# Patient Record
Sex: FEMALE | Race: WHITE | Employment: UNEMPLOYED | URBAN - METROPOLITAN AREA
[De-identification: names, ages, dates, MRNs, and addresses within clinical notes are randomized per-mention and may not be internally consistent; named-entity substitution may affect disease eponyms.]

---

## 2017-09-25 ENCOUNTER — OFFICE VISIT (OUTPATIENT)
Dept: INTERNAL MEDICINE CLINIC | Age: 57
End: 2017-09-25

## 2017-09-25 VITALS
HEIGHT: 65 IN | TEMPERATURE: 98 F | BODY MASS INDEX: 22.76 KG/M2 | DIASTOLIC BLOOD PRESSURE: 54 MMHG | RESPIRATION RATE: 14 BRPM | OXYGEN SATURATION: 98 % | WEIGHT: 136.6 LBS | HEART RATE: 85 BPM | SYSTOLIC BLOOD PRESSURE: 116 MMHG

## 2017-09-25 DIAGNOSIS — N93.9 VAGINAL BLEEDING: ICD-10-CM

## 2017-09-25 DIAGNOSIS — R30.0 DYSURIA: ICD-10-CM

## 2017-09-25 DIAGNOSIS — Z91.09 ENVIRONMENTAL ALLERGIES: Primary | ICD-10-CM

## 2017-09-25 DIAGNOSIS — Z00.00 ROUTINE GENERAL MEDICAL EXAMINATION AT A HEALTH CARE FACILITY: ICD-10-CM

## 2017-09-25 NOTE — PROGRESS NOTES
HISTORY OF PRESENT ILLNESS  Paolo Caldwell is a 61 y.o. female. HPI   Environmental allergies: Patient reports a bad cough. She states she has allergies. Vaginal bleeding: Patient reports dryness and pain with intercourse. She states she noticed bleeding after intercourse on two separate occassions. She states on both occasions the blood lasted for about a day in urine and in underwear. She states she has had burning with urination after intercourse. Dysuria: In addition patient has had an increased frequency with urination and pain with urination. Review of Systems   All other systems reviewed and are negative. Physical Exam   Constitutional: She is oriented to person, place, and time. She appears well-developed and well-nourished. HENT:   Head: Normocephalic and atraumatic. Right Ear: External ear normal.   Left Ear: External ear normal.   Nose: Nose normal.   Mouth/Throat: Oropharynx is clear and moist.   Eyes: Conjunctivae and EOM are normal.   Neck: Normal range of motion. Neck supple. Carotid bruit is not present. No thyroid mass and no thyromegaly present. Cardiovascular: Normal rate, regular rhythm, S1 normal, S2 normal, normal heart sounds and intact distal pulses. Pulmonary/Chest: Effort normal and breath sounds normal.   Abdominal: Soft. Normal appearance and bowel sounds are normal. There is no hepatosplenomegaly. There is no tenderness. Musculoskeletal: Normal range of motion. Neurological: She is alert and oriented to person, place, and time. She has normal strength. No cranial nerve deficit or sensory deficit. Coordination normal.   Skin: Skin is warm, dry and intact. No abrasion and no rash noted. Psychiatric: She has a normal mood and affect. Her behavior is normal. Judgment and thought content normal.   Nursing note and vitals reviewed. ASSESSMENT and PLAN  Diagnoses and all orders for this visit:    1. Environmental allergies  Patient will continue to watch.  She will follow up if condition worsens. 2. Vaginal bleeding  Patient will follow up with Dr. Denise Pearce. We are going to get an appt with her as she has had postmenopausal bleeding associated with intercourse    3. Routine general medical examination at a health care facility  -     CBC W/O DIFF  -     METABOLIC PANEL, COMPREHENSIVE  -     LIPID PANEL  -     TSH 3RD GENERATION    4. Dysuria  Will test urine and send for culture. -     AMB POC URINALYSIS DIP STICK AUTO W/O MICRO      lab results and schedule of future lab studies reviewed with patient  reviewed diet, exercise and weight control    Written by Jonathan Carey, as dictated by Mary Jim MD.     Current diagnosis and concerns discussed with pt at length. Understands risks and benefits or current treatment plan and medications and accepts the treatment and medication with any possible risks. Pt asks appropriate questions which were answered. Pt instructed to call with any concerns or problems.

## 2017-09-28 ENCOUNTER — DOCUMENTATION ONLY (OUTPATIENT)
Dept: INTERNAL MEDICINE CLINIC | Age: 57
End: 2017-09-28

## 2017-09-28 LAB
ALBUMIN SERPL-MCNC: 4.3 G/DL (ref 3.5–5.5)
ALBUMIN/GLOB SERPL: 1.7 {RATIO} (ref 1.2–2.2)
ALP SERPL-CCNC: 60 IU/L (ref 39–117)
ALT SERPL-CCNC: 26 IU/L (ref 0–32)
AST SERPL-CCNC: 21 IU/L (ref 0–40)
BILIRUB SERPL-MCNC: 0.3 MG/DL (ref 0–1.2)
BUN SERPL-MCNC: 14 MG/DL (ref 6–24)
BUN/CREAT SERPL: 16 (ref 9–23)
CALCIUM SERPL-MCNC: 9.3 MG/DL (ref 8.7–10.2)
CHLORIDE SERPL-SCNC: 103 MMOL/L (ref 96–106)
CHOLEST SERPL-MCNC: 190 MG/DL (ref 100–199)
CO2 SERPL-SCNC: 26 MMOL/L (ref 18–29)
CREAT SERPL-MCNC: 0.86 MG/DL (ref 0.57–1)
ERYTHROCYTE [DISTWIDTH] IN BLOOD BY AUTOMATED COUNT: 13.9 % (ref 12.3–15.4)
GLOBULIN SER CALC-MCNC: 2.5 G/DL (ref 1.5–4.5)
GLUCOSE SERPL-MCNC: 95 MG/DL (ref 65–99)
HCT VFR BLD AUTO: 41 % (ref 34–46.6)
HDLC SERPL-MCNC: 92 MG/DL
HGB BLD-MCNC: 13.4 G/DL (ref 11.1–15.9)
INTERPRETATION, 910389: NORMAL
LDLC SERPL CALC-MCNC: 87 MG/DL (ref 0–99)
MCH RBC QN AUTO: 30 PG (ref 26.6–33)
MCHC RBC AUTO-ENTMCNC: 32.7 G/DL (ref 31.5–35.7)
MCV RBC AUTO: 92 FL (ref 79–97)
PLATELET # BLD AUTO: 339 X10E3/UL (ref 150–379)
POTASSIUM SERPL-SCNC: 4.3 MMOL/L (ref 3.5–5.2)
PROT SERPL-MCNC: 6.8 G/DL (ref 6–8.5)
RBC # BLD AUTO: 4.46 X10E6/UL (ref 3.77–5.28)
SODIUM SERPL-SCNC: 143 MMOL/L (ref 134–144)
TRIGL SERPL-MCNC: 56 MG/DL (ref 0–149)
TSH SERPL DL<=0.005 MIU/L-ACNC: 3.66 UIU/ML (ref 0.45–4.5)
VLDLC SERPL CALC-MCNC: 11 MG/DL (ref 5–40)
WBC # BLD AUTO: 5.2 X10E3/UL (ref 3.4–10.8)

## 2017-09-28 NOTE — PROGRESS NOTES
Obtained appt with Dr. Alice Wilson, GYN, for Monday Oct 2 at 1:00. Pt is unable to make that appointment and will call to reschedule.

## 2017-10-26 ENCOUNTER — OFFICE VISIT (OUTPATIENT)
Dept: OBGYN CLINIC | Age: 57
End: 2017-10-26

## 2017-10-26 VITALS
DIASTOLIC BLOOD PRESSURE: 80 MMHG | HEIGHT: 65 IN | WEIGHT: 135 LBS | SYSTOLIC BLOOD PRESSURE: 120 MMHG | BODY MASS INDEX: 22.49 KG/M2

## 2017-10-26 DIAGNOSIS — N93.0 POSTCOITAL BLEEDING: ICD-10-CM

## 2017-10-26 DIAGNOSIS — N95.0 POST-MENOPAUSAL BLEEDING: Primary | ICD-10-CM

## 2017-10-26 DIAGNOSIS — Z71.1 WORRIED WELL: ICD-10-CM

## 2017-10-26 DIAGNOSIS — N89.8 VAGINAL DISCHARGE: ICD-10-CM

## 2017-10-26 LAB — WET MOUNT POCT, WMPOCT: NORMAL

## 2017-10-26 NOTE — PROGRESS NOTES
[address] [facility]  [email or web address]  [phone]    August Osgood, MD, FACOG       OB/GYN Problem visit    Chief Complaint:   Chief Complaint   Patient presents with    Post Menopausal Bleeding       History of Present Illness: This is a new problem being evaluated by this provider. The patient is a 61 y.o. No obstetric history on file. female who reports having IC for the [facility] time in [facility] long time\" after getting , and she red spotting when she wiped for about 1 day after IC. Patient states that it has happened twice now. Patient states that her last period was around age 47. She reports the symptoms are is unchanged. Aggravating factors include IC. Alleviating factors include none. Also concerned re unprotected intercourse    She does not have other concerns. LMP: No LMP recorded. Patient is postmenopausal.    PFSH:  Past Medical History:   Diagnosis Date    Hx of colonoscopy     Hx of mammogram 2015    Negative     Routine Papanicolaou smear      Past Surgical History:   Procedure Laterality Date    HX  SECTION      HX ORTHOPAEDIC      ACL recon left     Family History   Problem Relation Age of Onset    Diabetes Mother     Hypertension Mother     Heart Disease Father 79    Dementia Father      Social History   Substance Use Topics    Smoking status: Never Smoker    Smokeless tobacco: Never Used    Alcohol use No     Allergies   Allergen Reactions    Pcn [Penicillins] Nausea Only     No current outpatient prescriptions on file. No current facility-administered medications for this visit.         Review of Systems:  History obtained from the patient  Constitutional: negative for fevers, chills and weight loss  ENT ROS: negative for - hearing change, oral lesions or visual changes  Respiratory: negative for cough, wheezing or dyspnea on exertion  Cardiovascular: negative for chest pain, irregular heart beats, exertional chest pressure/discomfort  Gastrointestinal: negative for dysphagia, nausea and vomiting  Genito-Urinary ROS:  see HPI  Inteument/breast: negative for rash, breast lump and nipple discharge  Musculoskeletal:negative for stiff joints, neck pain and muscle weakness  Endocrine ROS: negative for - breast changes, galactorrhea or temperature intolerance  Hematological and Lymphatic ROS: negative for - blood clots, bruising or swollen lymph nodes    Physical Exam:  Visit Vitals    /80    Ht 5' 5\" (1.651 m)    Wt 135 lb (61.2 kg)    BMI 22.47 kg/m2       GENERAL: alert, well appearing, and in no distress  HEAD: normocephalic, atraumatic. PULM: clear to auscultation, no wheezes, rales or rhonchi, symmetric air entry   COR: normal rate and regular rhythm, S1 and S2 normal   ABDOMEN: soft, nontender, nondistended, no masses or organomegaly   EGBUS: no lesions, no inflammation, no masses  VULVA: normal appearing vulva with no masses, tenderness or lesions  VAGINA: normal appearing vagina with normal color, no lesions, yellow and thin discharge  CERVIX: normal appearing cervix without discharge or lesions, non tender  UTERUS: uterus is normal size, shape, consistency and nontender   ADNEXA: normal adnexa in size, nontender and no masses  NEURO: alert, oriented, normal speech    Assessment:  Encounter Diagnoses   Name Primary?  Post-menopausal bleeding Yes    Vaginal discharge     Postcoital bleeding     Worried well        Plan:  The patient is advised that she should contact the office if she does not note improvement or if symptoms recur  Recommend follow up with PCP for non-gynecologic complaints and chronic medical problems. She should contact our office with any questions or concerns  She could keep her routine annual exam appointment.    Discussed avoiding painful intercourse, generous lubrication and vaginal moisturiser for at lest three months  FU three months if NI  Discussed safer sex practices, condom use and risk factors for sexually transmitted diseases.        Disc causes of AUB and importance of evaluation  Rec fu and US, add AE if due, poss endo bx  Endo bx h/o    rec std blood work< pt left before doing labs, can offer at [facility]    Orders Placed This Encounter    NUSWAB VAGINITIS PLUS    AMB POC WET PREP (AKA STAIN, INTERPRET, WET MOUNT)       Results for orders placed or performed in visit on 10/26/17   AMB POC SMEAR, STAIN & INTERPRET, WET MOUNT   Result Value Ref Range    Wet mount (POC)      Narrative    PRASANTH    Hypae: negative  Buds: negative    Wet Prep:  Trich: negative  Clue cells: negative  Hyphae: negative  Buds: negative  WBC's: normal

## 2017-10-26 NOTE — MR AVS SNAPSHOT
Visit Information Date & Time Provider Department Dept. Phone Encounter #  
 10/26/2017  9:20 AM Angel Monsalve MD [facility] [phone] [id number] Upcoming Health Maintenance Date Due Hepatitis C Screening 1960 PAP AKA CERVICAL CYTOLOGY 4/20/2013 BREAST CANCER SCRN MAMMOGRAM 1/7/2017 INFLUENZA AGE 9 TO ADULT 8/1/2017 COLONOSCOPY 2/3/2020 Allergies as of 10/26/2017  Review Complete On: 10/26/2017 By: Corrie Chisholm LPN Severity Noted Reaction Type Reactions Pcn [Penicillins]  04/20/2011   Side Effect Nausea Only Current Immunizations  Never Reviewed No immunizations on file. Not reviewed this visit Vitals BP Height(growth percentile) Weight(growth percentile) BMI OB Status Smoking Status 120/80 5' 5\" (1.651 m) 135 lb (61.2 kg) 22.47 kg/m2 Postmenopausal Never Smoker BMI and BSA Data Body Mass Index Body Surface Area  
 22.47 kg/m 2 1.68 m 2 Preferred Pharmacy Pharmacy Name Phone [address], [address] [place] [address] [phone] Your Updated Medication List  
  
Notice  As of 10/26/2017  9:59 AM  
 You have not been prescribed any medications. Patient Instructions Vaginal Bleeding After Sex: Care Instructions Your Care Instructions Bleeding from the vagina after sex often is caused by an infection. Other possible causes are a tear in the vagina or a growth (polyp) on the cervix. You may need a physical and pelvic exam to find the cause of your vaginal bleeding. Follow-up care is a key part of your treatment and safety. Be sure to make and go to all appointments, and call your doctor if you are having problems. It's also a good idea to know your test results and keep a list of the medicines you take. How can you care for yourself at home? · If your doctor gave you medicine, take it exactly as prescribed.  Call your doctor if you think you are having a problem with your medicine. · Do not douche. · Use pads, not tampons, for menstrual bleeding. · Do not have sex until you talk with your doctor. · Be sure to tell your sex partner or partners that you have had bleeding after sex. They may need a doctor to check them for infection. When should you call for help? Call your doctor now or seek immediate medical care if: 
· You have severe vaginal bleeding. · You have new or worse belly or pelvic pain. Watch closely for changes in your health, and be sure to contact your doctor if: 
· You have unusual vaginal bleeding. · You do not get better as expected. Where can you learn more? Go to http://donna-jesu.info/. Enter C122 in the search box to learn more about \"Vaginal Bleeding After Sex: Care Instructions. \" Current as of: October 13, 2016 Content Version: 11.4 © 6978-3305 [employer]. Care instructions adapted under license by [employer] (which disclaims liability or warranty for this information). If you have questions about a medical condition or this instruction, always ask your healthcare professional. [place] [address] any warranty or liability for your use of this information. Introducing \[facility] [place] [facility]\"" & HEALTH SERVICES! Dear Lenin Thompson: Thank you for requesting a Ak?Lex account. Our records indicate that you have previously registered for a Ak?Lex account but its currently inactive. Please call our Ak?Lex support line at [phone]. Additional Information If you have questions, please visit the Frequently Asked Questions section of the Ak?Lex website at [email or web address] Remember, Ak?Lex is NOT to be used for urgent needs. For medical emergencies, dial 911. Now available from your iPhone and Android! Please provide this summary of care documentation to your next provider. Your primary care clinician is listed as Meeta Greene. If you have any questions after today's visit, please call [phone].

## 2017-10-26 NOTE — PATIENT INSTRUCTIONS
Vaginal Bleeding After Sex: Care Instructions  Your Care Instructions    Bleeding from the vagina after sex often is caused by an infection. Other possible causes are a tear in the vagina or a growth (polyp) on the cervix. You may need a physical and pelvic exam to find the cause of your vaginal bleeding. Follow-up care is a key part of your treatment and safety. Be sure to make and go to all appointments, and call your doctor if you are having problems. It's also a good idea to know your test results and keep a list of the medicines you take. How can you care for yourself at home? · If your doctor gave you medicine, take it exactly as prescribed. Call your doctor if you think you are having a problem with your medicine. · Do not douche. · Use pads, not tampons, for menstrual bleeding. · Do not have sex until you talk with your doctor. · Be sure to tell your sex partner or partners that you have had bleeding after sex. They may need a doctor to check them for infection. When should you call for help? Call your doctor now or seek immediate medical care if:  · You have severe vaginal bleeding. · You have new or worse belly or pelvic pain. Watch closely for changes in your health, and be sure to contact your doctor if:  · You have unusual vaginal bleeding. · You do not get better as expected. Where can you learn more? Go to http://donna-jesu.info/. Enter S407 in the search box to learn more about \"Vaginal Bleeding After Sex: Care Instructions. \"  Current as of: October 13, 2016  Content Version: 11.4  © 1154-0150 [employer]. Care instructions adapted under license by [employer] (which disclaims liability or warranty for this information). If you have questions about a medical condition or this instruction, always ask your healthcare professional. [address] any warranty or liability for your use of this information.

## 2017-10-30 LAB
A VAGINAE DNA VAG QL NAA+PROBE: ABNORMAL SCORE
BVAB2 DNA VAG QL NAA+PROBE: ABNORMAL SCORE
C ALBICANS DNA VAG QL NAA+PROBE: POSITIVE
C GLABRATA DNA VAG QL NAA+PROBE: NEGATIVE
C TRACH RRNA SPEC QL NAA+PROBE: NEGATIVE
MEGA1 DNA VAG QL NAA+PROBE: ABNORMAL SCORE
N GONORRHOEA RRNA SPEC QL NAA+PROBE: NEGATIVE
T VAGINALIS RRNA SPEC QL NAA+PROBE: NEGATIVE

## 2017-10-30 NOTE — PROGRESS NOTES
Abnormal results. Please notify pt. Consistent with a yeast infection. If having symptoms  Rec rx: diflucan 150mg x1  Or if pt prefers, or is pregnant,   Rec over the counter 3d or 7d miconazole vaginal treatment (like Monistat)  RTC if NI after 7 days.

## 2017-11-03 ENCOUNTER — TELEPHONE (OUTPATIENT)
Dept: OBGYN CLINIC | Age: 57
End: 2017-11-03

## 2017-11-03 RX ORDER — FLUCONAZOLE 150 MG/1
150 TABLET ORAL DAILY
Qty: 1 TAB | Refills: 0 | Status: SHIPPED | OUTPATIENT
Start: 2017-11-03 | End: 2017-11-04

## 2017-11-03 NOTE — TELEPHONE ENCOUNTER
Patient notified of results and MD recommendations. Patient verbalized understanding. Prescription sent to patient's preferred pharmacy per MD order. ----- Message from Latisha Ragsdale MD sent at 10/30/2017 10:02 AM EDT -----  Abnormal results. Please notify pt. Consistent with a yeast infection. If having symptoms  Rec rx: diflucan 150mg x1  Or if pt prefers, or is pregnant,   Rec over the counter 3d or 7d miconazole vaginal treatment (like Monistat)  RTC if NI after 7 days.

## 2017-11-21 ENCOUNTER — OFFICE VISIT (OUTPATIENT)
Dept: OBGYN CLINIC | Age: 57
End: 2017-11-21

## 2017-11-21 VITALS
WEIGHT: 134.4 LBS | DIASTOLIC BLOOD PRESSURE: 58 MMHG | HEIGHT: 65 IN | BODY MASS INDEX: 22.39 KG/M2 | SYSTOLIC BLOOD PRESSURE: 110 MMHG

## 2017-11-21 DIAGNOSIS — Z11.51 SCREENING FOR HPV (HUMAN PAPILLOMAVIRUS): ICD-10-CM

## 2017-11-21 DIAGNOSIS — N95.0 POSTMENOPAUSAL BLEEDING: ICD-10-CM

## 2017-11-21 DIAGNOSIS — Z01.419 ENCOUNTER FOR GYNECOLOGICAL EXAMINATION (GENERAL) (ROUTINE) WITHOUT ABNORMAL FINDINGS: Primary | ICD-10-CM

## 2017-11-21 NOTE — MR AVS SNAPSHOT
Visit Information Date & Time Provider Department Dept. Phone Encounter #  
 11/21/2017 10:10 AM Armen Barros MD [facility] [phone] [id number] Upcoming Health Maintenance Date Due Hepatitis C Screening 1960 PAP AKA CERVICAL CYTOLOGY 4/20/2013 BREAST CANCER SCRN MAMMOGRAM 1/7/2017 Influenza Age 5 to Adult 8/1/2017 COLONOSCOPY 2/3/2020 Allergies as of 11/21/2017  Review Complete On: 11/21/2017 By: Flex Medina LPN Severity Noted Reaction Type Reactions Pcn [Penicillins]  04/20/2011   Side Effect Nausea Only Current Immunizations  Never Reviewed No immunizations on file. Not reviewed this visit Vitals BP Height(growth percentile) Weight(growth percentile) BMI OB Status Smoking Status 110/58 (BP 1 Location: Left arm, BP Patient Position: Sitting) 5' 5\" (1.651 m) 134 lb 6.4 oz (61 kg) 22.37 kg/m2 Postmenopausal Never Smoker BMI and BSA Data Body Mass Index Body Surface Area  
 22.37 kg/m 2 1.67 m 2 Preferred Pharmacy Pharmacy Name Phone [facility], [address] [phone] Your Updated Medication List  
  
Notice  As of 11/21/2017 10:58 AM  
 You have not been prescribed any medications. Patient Instructions Pelvic Exam: Care Instructions Your Care Instructions When your doctor examines all of your pelvic organs, it's called a pelvic exam. Two good reasons to have this kind of exam are to check for sexually transmitted infections (STIs) and to get a Pap test. A Pap test is also called a Pap smear. It checks for early changes that can lead to cancer of the cervix. Sometimes a pelvic exam is part of a regular checkup. In this case, you can do some things to make your test results as accurate as possible. · Try to schedule the exam when you don't have your period. · Don't use douches, tampons, or vaginal medicines, sprays, or powders for 24 hours before your exam. 
· Don't have sex for 24 hours before your exam. 
Other times, women have this kind of exam at any time of the month. This is because they have pelvic pain, bleeding, or discharge. Or they may have another pelvic problem. Before your exam, it's important to share some information with your doctor. For example, if you are a survivor of rape or sexual abuse, you can talk about any concerns you may have. Your doctor will also want to know if you are pregnant or use birth control. And he or she will want to hear about any problems, surgeries, or procedures you have had in your pelvic area. You will also need to tell your doctor when your last period was. Follow-up care is a key part of your treatment and safety. Be sure to make and go to all appointments, and call your doctor if you are having problems. It's also a good idea to know your test results and keep a list of the medicines you take. How is a pelvic exam done? · During a pelvic exam, you will: ¨ Take off your clothes below the waist. You will get a paper or cloth cover to put over the lower half of your body. Josephus Phlegm on your back on an exam table. Your feet will be raised above you. Stirrups will support your feet. · The doctor will: ¨ Ask you to relax your knees. Your knees need to lean out, toward the walls. ¨ Check the opening of your vagina for sores or swelling. ¨ Gently put a tool called a speculum into your vagina. It opens the vagina a little bit. You will feel some pressure. But if you are relaxed, it will not hurt. It lets your doctor see inside the vagina. ¨ Use a small brush, spatula, or swab to get a sample of cells, if you are having a Pap test or culture. The doctor then removes the speculum. ¨ Put on gloves and put one or two fingers of one hand into your vagina. The other hand goes on your lower belly.  This lets your doctor feel your pelvic organs. You will probably feel some pressure. Try to stay relaxed. ¨ Put one gloved finger into your rectum and one into your vagina, if needed. This can also help check your pelvic organs. This exam takes about 10 minutes. At the end, you will get a washcloth or tissue to clean your vaginal area. It's normal to have some discharge after this exam. You can then get dressed. Some test results may be ready right away. But results from a culture or a Pap test may take several days or a few weeks. Why should you have a pelvic exam? 
· You want to have recommended screening tests. This includes a Pap test. 
· You think you have a vaginal infection. Signs include itching, burning, or unusual discharge. · You might have been exposed to a sexually transmitted infection (STI), such as chlamydia or herpes. · You have vaginal bleeding that is not part of your normal menstrual period. · You have pain in your belly or pelvis. · You have been sexually assaulted. A pelvic exam lets your doctor collect evidence and check for STIs. · You are pregnant. · You are having trouble getting pregnant. What are the risks of a pelvic exam? 
There are no risks from a pelvic exam. 
When should you call for help? Watch closely for changes in your health, and be sure to contact your doctor if you have any problems. Where can you learn more? Go to http://donna-jesu.info/. Enter Y139 in the search box to learn more about \"Pelvic Exam: Care Instructions. \" Current as of: October 13, 2016 Content Version: 11.4 © 1724-1123 [employer]. Care instructions adapted under license by [employer] (which disclaims liability or warranty for this information). If you have questions about a medical condition or this instruction, always ask your healthcare professional. [address] any warranty or liability for your use of this information. Introducing [facility] & HEALTH SERVICES! [employer] introduces Voxli patient portal. Now you can access parts of your medical record, email your doctor's office, and request medication refills online. 1. In your internet browser, go to [email or web address] 2. Click on the First Time User? Click Here link in the Sign In box. You will see the New Member Sign Up page. 3. Enter your Voxli Access Code exactly as it appears below. You will not need to use this code after youve completed the sign-up process. If you do not sign up before the expiration date, you must request a new code. · Voxli Access Code: [email or web address] Expires: 1/24/2018  1:05 PM 
 
4. Enter the last four digits of your Social Security Number (xxxx) and Date of Birth (mm/dd/yyyy) as indicated and click Submit. You will be taken to the next sign-up page. 5. Create a Voxli ID. This will be your Voxli login ID and cannot be changed, so think of one that is secure and easy to remember. 6. Create a Voxli password. You can change your password at any time. 7. Enter your Password Reset Question and Answer. This can be used at a later time if you forget your password. 8. Enter your e-mail address. You will receive e-mail notification when new information is available in [address] 9. Click Sign Up. You can now view and download portions of your medical record. 10. Click the Download Summary menu link to download a portable copy of your medical information. If you have questions, please visit the Frequently Asked Questions section of the Voxli website. Remember, Voxli is NOT to be used for urgent needs. For medical emergencies, dial 911. Now available from your iPhone and Android! Please provide this summary of care documentation to your next provider. Your primary care clinician is listed as Jud Stearns. If you have any questions after today's visit, please call [phone].

## 2017-11-21 NOTE — PROGRESS NOTES
Physician review of ultrasound performed by technician    Today's ultrasound report and images were reviewed and discussed with the patient. Please see images and imaging report entered by technician in PACS for more detail and progress note and diagnosis entered by MD.    Mario Man MD    UTERUS IS ANTEVERTED, NORMAL IN SIZE AND ECHOGENICITY. ENDOMETRIUM MEASURES 1.3MM IN THICKNESS. FLUID IS SEEN IN THE ENDOMETRIUM. RIGHT OVARY APPEARS WITHIN NORMAL LIMITS. LEFT ADNEXA IS BOWEL FILLED. NO FREE FLUID SEEN IN THE CDS. [facility]  [email or web address]  [phone]    Willistine Nissen, MD, [address] [facility]       Annual Gynecologic Exam  WWE 19-50  Chief Complaint   Patient presents with    Well Woman    Other      bleeding       Mg Aguilar is a 61 y.o.  [facility]  female who presents for an annual exam.  No LMP recorded. Patient is postmenopausal..    She does not report additional concerns today. No more bleeding. Sexual history and Contraception:  History   Sexual Activity    Sexual activity: Yes    Partners: Male     She never uses condoms with sexual activity. She does reports new sexual partner(s) in the last year. The patient does not request STD testing. Preventive Medicine History:  Her last annual GYN exam was about unknown  Her most recent Pap smear result:unknown. Her most recent HR HPV screen was unknown  She does not have a history of SAIMA 2, 3 or cervical cancer. Breast health:  Last mammogram: was normal.   A mammogram was not scheduled for today. Breast cancer family updated: see . Bone health: Mikaela Maldonado She does not have a history of osteopenia/osteoporosis. Osteoporosis family history updated: see .      Past Medical History:   Diagnosis Date    Hx of colonoscopy     Hx of mammogram 2015    Negative     Routine Papanicolaou smear      OB History    Para Term  AB Living   4    3    SAB TAB Ectopic Molar Multiple Live Births   3           # Outcome Date GA Lbr Ruddy/2nd Weight Sex Delivery Anes PTL Lv   4       CS-Unspec Spinal     3 SAB            2 SAB            1 SAB                   Past Surgical History:   Procedure Laterality Date    HX  SECTION      HX ORTHOPAEDIC      ACL recon left     Family History   Problem Relation Age of Onset    Diabetes Mother     Hypertension Mother     Heart Disease Father 79    Dementia Father      Social History     Social History    Marital status: LEGALLY      Spouse name: N/A    Number of children: N/A    Years of education: N/A     Occupational History    Not on file. Social History Main Topics    Smoking status: Never Smoker    Smokeless tobacco: Never Used    Alcohol use No    Drug use: No    Sexual activity: Yes     Partners: Male     Other Topics Concern    Not on file     Social History Narrative       Allergies   Allergen Reactions    Pcn [Penicillins] Nausea Only       No current outpatient prescriptions on file. No current facility-administered medications for this visit. There is no problem list on file for this patient.       Review of Systems - History obtained from the patient  Constitutional: negative for weight loss, fever, night sweats  HEENT: negative for hearing loss, earache, congestion, snoring, sorethroat  CV: negative for chest pain, palpitations, edema  Resp: negative for cough, shortness of breath, wheezing  GI: negative for change in bowel habits, abdominal pain, black or bloody stools  : negative for frequency, dysuria, hematuria, vaginal discharge  MSK: negative for back pain, joint pain, muscle pain  Breast: negative for breast lumps, nipple discharge, galactorrhea  Skin :negative for itching, rash, hives  Neuro: negative for dizziness, headache, confusion, weakness  Psych: negative for anxiety, depression, change in mood  Heme/lymph: negative for bleeding, bruising, pallor    Physical Exam  Visit Vitals    /58 (BP 1 Location: Left arm, BP Patient Position: Sitting)    Ht 5' 5\" (1.651 m)    Wt 134 lb 6.4 oz (61 kg)    BMI 22.37 kg/m2       Constitutional  · Appearance: well-nourished, well developed, alert, in no acute distress    HENT  · Head and Face: appears normal    Neck  · Inspection/Palpation: normal appearance, no masses or tenderness  · Lymph Nodes: no lymphadenopathy present  · Thyroid: gland size normal, nontender, no nodules or masses present on palpation    Chest  · Respiratory Effort: breathing unlabored  · Auscultation: normal breath sounds    Cardiovascular  · Heart:  · Auscultation: regular rate and rhythm without murmur    Breasts  · Inspection of Breasts: breasts symmetrical, no skin changes, no discharge present, nipple appearance normal, no skin retraction present  · Palpation of Breasts and Axillae: no masses present on palpation, no breast tenderness  · Axillary Lymph Nodes: no lymphadenopathy present    Gastrointestinal  · Abdominal Examination: abdomen non-tender to palpation, normal bowel sounds, no masses present  · Liver and spleen: no hepatomegaly present, spleen not palpable  · Hernias: no hernias identified    Genitourinary  · External Genitalia: normal appearance for age, no discharge present, no tenderness present, no inflammatory lesions present, no masses present,  atrophy present  · Vagina: normal vaginal vault without central or paravaginal defects, no discharge present, no inflammatory lesions present, no masses present  · Bladder: non-tender to palpation  · Urethra: appears normal  · Cervix: normal   · Uterus: normal size, shape and consistency  · Adnexa: no adnexal tenderness present, no adnexal masses present  · Perineum: perineum within normal limits, no evidence of trauma, no rashes or skin lesions present  · Anus: anus within normal limits, no hemorrhoids present  · Inguinal Lymph Nodes: no lymphadenopathy present    Skin  · General Inspection: no rash, no lesions identified    Neurologic/Psychiatric  · Mental Status:  · Orientation: grossly oriented to person, place and time  · Mood and Affect: mood normal, affect appropriate    Assessment:  61 y.o.  for well woman exam  Encounter Diagnoses   Name Primary?  Encounter for gynecological examination (general) (routine) without abnormal findings Yes    Screening for HPV (human papillomavirus)     Postmenopausal bleeding        Plan:  The patient was counseled about diet, exercise, healthy lifestyle  We discussed current self breast exam and mammogram recommendations  We discussed current pap smear and HR HPV testing guidelines  We recommend follow up in one year for routine annual gynecologic exam or sooner if needed  We recommend follow up with a primary care physician for any chronic medical problems or non-gynecologic concerns    We discussed calcium/vitamin D/weight bearing exercise and osteoporosis prevention  Handouts were given to the patient   Notify MD if  bleeding recurs  Disc vaginal moisturizer/lubrication prn  Folllow up:  [x] return for annual well woman exam in one year or sooner if she is having problems  [] follow up and ultrasound  [x] mammogram  [] 6 months  [] 3 months  [] 1 month    Orders Placed This Encounter    PAP IG, HPV AND RFX HPV ([id number])       Results for orders placed or performed in visit on 17   US TRANSVAGINAL    Narrative    See impression. Impression    Impression: The final result for this exam can be located in this patient's chart with  results from [facility].

## 2017-11-21 NOTE — PATIENT INSTRUCTIONS
Pelvic Exam: Care Instructions  Your Care Instructions    When your doctor examines all of your pelvic organs, it's called a pelvic exam. Two good reasons to have this kind of exam are to check for sexually transmitted infections (STIs) and to get a Pap test. A Pap test is also called a Pap smear. It checks for early changes that can lead to cancer of the cervix. Sometimes a pelvic exam is part of a regular checkup. In this case, you can do some things to make your test results as accurate as possible. · Try to schedule the exam when you don't have your period. · Don't use douches, tampons, or vaginal medicines, sprays, or powders for 24 hours before your exam.  · Don't have sex for 24 hours before your exam.  Other times, women have this kind of exam at any time of the month. This is because they have pelvic pain, bleeding, or discharge. Or they may have another pelvic problem. Before your exam, it's important to share some information with your doctor. For example, if you are a survivor of rape or sexual abuse, you can talk about any concerns you may have. Your doctor will also want to know if you are pregnant or use birth control. And he or she will want to hear about any problems, surgeries, or procedures you have had in your pelvic area. You will also need to tell your doctor when your last period was. Follow-up care is a key part of your treatment and safety. Be sure to make and go to all appointments, and call your doctor if you are having problems. It's also a good idea to know your test results and keep a list of the medicines you take. How is a pelvic exam done? · During a pelvic exam, you will:  ¨ Take off your clothes below the waist. You will get a paper or cloth cover to put over the lower half of your body. Jeremy Medeiros on your back on an exam table. Your feet will be raised above you. Stirrups will support your feet. · The doctor will:  Shruti Borrow you to relax your knees.  Your knees need to lean out, toward the walls. ¨ Check the opening of your vagina for sores or swelling. ¨ Gently put a tool called a speculum into your vagina. It opens the vagina a little bit. You will feel some pressure. But if you are relaxed, it will not hurt. It lets your doctor see inside the vagina. ¨ Use a small brush, spatula, or swab to get a sample of cells, if you are having a Pap test or culture. The doctor then removes the speculum. ¨ Put on gloves and put one or two fingers of one hand into your vagina. The other hand goes on your lower belly. This lets your doctor feel your pelvic organs. You will probably feel some pressure. Try to stay relaxed. ¨ Put one gloved finger into your rectum and one into your vagina, if needed. This can also help check your pelvic organs. This exam takes about 10 minutes. At the end, you will get a washcloth or tissue to clean your vaginal area. It's normal to have some discharge after this exam. You can then get dressed. Some test results may be ready right away. But results from a culture or a Pap test may take several days or a few weeks. Why should you have a pelvic exam?  · You want to have recommended screening tests. This includes a Pap test.  · You think you have a vaginal infection. Signs include itching, burning, or unusual discharge. · You might have been exposed to a sexually transmitted infection (STI), such as chlamydia or herpes. · You have vaginal bleeding that is not part of your normal menstrual period. · You have pain in your belly or pelvis. · You have been sexually assaulted. A pelvic exam lets your doctor collect evidence and check for STIs. · You are pregnant. · You are having trouble getting pregnant. What are the risks of a pelvic exam?  There are no risks from a pelvic exam.  When should you call for help? Watch closely for changes in your health, and be sure to contact your doctor if you have any problems. Where can you learn more?   Go to http://donna-jesu.info/. Enter L544 in the search box to learn more about \"Pelvic Exam: Care Instructions. \"  Current as of: October 13, 2016  Content Version: 11.4  © 4263-0744 [employer]formerly [employer] Care instructions adapted under license by [employer] (which disclaims liability or warranty for this information). If you have questions about a medical condition or this instruction, always ask your healthcare professional. [place] [address] any warranty or liability for your use of this information.

## 2017-11-27 LAB
CYTOLOGIST CVX/VAG CYTO: NORMAL
CYTOLOGY CVX/VAG DOC THIN PREP: NORMAL
CYTOLOGY HISTORY:: NORMAL
DX ICD CODE: NORMAL
HPV I/H RISK 1 DNA CVX QL PROBE+SIG AMP: NEGATIVE
Lab: NORMAL
OTHER STN SPEC: NORMAL
PATH REPORT.FINAL DX SPEC: NORMAL
STAT OF ADQ CVX/VAG CYTO-IMP: NORMAL

## 2019-06-04 ENCOUNTER — TELEPHONE (OUTPATIENT)
Dept: INTERNAL MEDICINE CLINIC | Age: 59
End: 2019-06-04

## 2019-06-04 NOTE — TELEPHONE ENCOUNTER
Patient has been scheduled for 1145 am tmrw in the double book spot .  Patient advised nurse will reach out if apt time will not work for PCP's schedule

## 2019-06-04 NOTE — TELEPHONE ENCOUNTER
----- Message from Karri Kapadia sent at 6/4/2019  4:56 PM EDT -----  Regarding: Dr. Cordell Jordan   Pt is requesting a call back regarding she had fallen into her dresser 06/02/2019 and is now having some systems from the fall such as a dull headache, balance is off. She would like to be seen by the  due she is going out of the country in a wk. Best contact is [phone] or (c) [phone].

## 2019-06-05 ENCOUNTER — TELEPHONE (OUTPATIENT)
Dept: INTERNAL MEDICINE CLINIC | Age: 59
End: 2019-06-05

## 2019-06-05 ENCOUNTER — OFFICE VISIT (OUTPATIENT)
Dept: INTERNAL MEDICINE CLINIC | Age: 59
End: 2019-06-05

## 2019-06-05 VITALS
TEMPERATURE: 97.6 F | BODY MASS INDEX: 23.53 KG/M2 | DIASTOLIC BLOOD PRESSURE: 73 MMHG | OXYGEN SATURATION: 97 % | RESPIRATION RATE: 18 BRPM | HEIGHT: 65 IN | HEART RATE: 74 BPM | SYSTOLIC BLOOD PRESSURE: 137 MMHG | WEIGHT: 141.2 LBS

## 2019-06-05 DIAGNOSIS — G44.311 INTRACTABLE ACUTE POST-TRAUMATIC HEADACHE: Primary | ICD-10-CM

## 2019-06-05 DIAGNOSIS — R51.9 ACUTE INTRACTABLE HEADACHE, UNSPECIFIED HEADACHE TYPE: Primary | ICD-10-CM

## 2019-06-05 NOTE — PROGRESS NOTES
HISTORY OF PRESENT ILLNESS  Birdie Bangura is a 60 y.o. female. HPI  Head/fall: Pt reports she fell 06/02/19 when walking to the bathroom in the middle of the night. When she fell she hit her head on the side of her dresser. She tried to stay awake after she fell. She had a dull HA and took some Advil with improvement. Denies memory issues, dilated pupils or dizziness. Pt notes she has felt kind of \"fuzzy and woozy\". Pt notes she is going to [employer] next week. Review of Systems   All other systems reviewed and are negative. Physical Exam   Constitutional: She is oriented to person, place, and time. She appears well-developed and well-nourished. HENT:   Head: Normocephalic and atraumatic. Right Ear: External ear normal.   Left Ear: External ear normal.   Nose: Nose normal.   Mouth/Throat: Oropharynx is clear and moist.   Eyes: Pupils are equal, round, and reactive to light. Conjunctivae and EOM are normal.   Neck: Normal range of motion. Neck supple. Cardiovascular: Normal rate, regular rhythm, normal heart sounds and intact distal pulses. Pulmonary/Chest: Effort normal and breath sounds normal. Right breast exhibits no inverted nipple, no mass, no nipple discharge, no skin change and no tenderness. Left breast exhibits no inverted nipple, no mass, no nipple discharge, no skin change and no tenderness. No breast swelling, tenderness, discharge or bleeding. Breasts are symmetrical.   Abdominal: Soft. Bowel sounds are normal.   Genitourinary: Rectum normal and vagina normal. Rectal exam shows anal tone normal and guaiac negative stool. No breast swelling, tenderness, discharge or bleeding. Musculoskeletal: Normal range of motion. Neurological: She is alert and oriented to person, place, and time. Skin: Skin is warm and dry. Psychiatric: She has a normal mood and affect. Her behavior is normal. Judgment and thought content normal.   Nursing note and vitals reviewed.       ASSESSMENT and PLAN  Diagnoses and all orders for this visit:    1. Intractable acute post-traumatic headache  Ordered head CT to r/o any underlying issues. Advised pt to take a \"mental break\" and to stay away from loud things, bright screens and staring at a screen for long periods of time.  -     CT HEAD W WO CONT; Future    Lab results and schedule of future lab studies reviewed with patient. Reviewed diet, exercise and weight control. Written by Jessica Watt, as dictated by Tobi Hendrix MD.     Current diagnosis and concerns discussed with pt at length. Understands risks and benefits or current treatment plan and medications and accepts the treatment and medication with any possible risks. Pt asks appropriate questions which were answered. Pt instructed to call with any concerns or problems. This note will not be viewable in [address]

## 2019-06-05 NOTE — TELEPHONE ENCOUNTER
Patient states she now has Gerald Fleming and needs authorization for her appointment tomorrow at [address] [place] [address] at 1:15pm for CT of head. Thanks.

## 2019-06-07 ENCOUNTER — TELEPHONE (OUTPATIENT)
Dept: INTERNAL MEDICINE CLINIC | Age: 59
End: 2019-06-07

## 2019-06-10 NOTE — TELEPHONE ENCOUNTER
Dr. Gardner Better received CT report and advised that it is WNL other than a h/o a nasal fracture. Left message for patient to return my call so I can relay the results to her.

## 2019-06-24 ENCOUNTER — TELEPHONE (OUTPATIENT)
Dept: INTERNAL MEDICINE CLINIC | Age: 59
End: 2019-06-24

## 2019-06-24 ENCOUNTER — OFFICE VISIT (OUTPATIENT)
Dept: INTERNAL MEDICINE CLINIC | Age: 59
End: 2019-06-24

## 2019-06-24 VITALS
DIASTOLIC BLOOD PRESSURE: 63 MMHG | HEART RATE: 86 BPM | BODY MASS INDEX: 23.32 KG/M2 | SYSTOLIC BLOOD PRESSURE: 99 MMHG | TEMPERATURE: 97.9 F | RESPIRATION RATE: 18 BRPM | HEIGHT: 65 IN | OXYGEN SATURATION: 97 % | WEIGHT: 140 LBS

## 2019-06-24 DIAGNOSIS — H93.11 TINNITUS OF RIGHT EAR: ICD-10-CM

## 2019-06-24 DIAGNOSIS — R42 LIGHTHEADED: Primary | ICD-10-CM

## 2019-06-24 DIAGNOSIS — R07.89 CHEST TIGHTNESS OR PRESSURE: ICD-10-CM

## 2019-06-24 NOTE — PROGRESS NOTES
HISTORY OF PRESENT ILLNESS  Caridad Moore is a 60 y.o. female. HPI  Lightheadedness: Pt notes that when she was on the plane to [facility] she grabbed the vomit bag during take off and thought she was going to pass out. She noted it took a few days for her to feel better. Pt notes that she was slightly constipated. She notes clear urine. She notes she ate a lot of bread and potatoes. She notes that she had some cramps in her side when flying back. Pt notes that she has had more urgent BM's before the trip. She notes improvement in diarrhea by cutting back on eating nuts. When she came back form [facility] she noted that she stayed the night at a hotel instead of driving home because she felt \"weird\" on Friday night (June 21st). Pt notes she has occasional cramping in her abdomen. Pt notes that the next day she felt like she was going to black out and she went to [facility]. Her urine, WBC, hemoglobin, and electrolytes were good. She notes that [facility] (On Saturday) told her she had fluid in her ear and she should take decongestant. Denies calf discomfort or pain upon palpation. Pt notes that occasionally when she falls asleep she gets a tightness around her chest. Denies tightness while walking or with movement. She also notes a sharp small behind her eyes. She explains she should see an optometrist any ways. Review of Systems   HENT: Positive for tinnitus. Small sharp eye pain. Tinnitus (>1 year). Neurological: Positive for dizziness and headaches. All other systems reviewed and are negative. Physical Exam   Constitutional: She is oriented to person, place, and time. She appears well-developed and well-nourished. HENT:   Head: Normocephalic and atraumatic. Right Ear: External ear normal.   Left Ear: External ear normal.   Nose: Nose normal.   Mouth/Throat: Oropharynx is clear and moist.   Eyes: Pupils are equal, round, and reactive to light.  Conjunctivae and EOM are normal. Neck: Normal range of motion. Neck supple. Cardiovascular: Normal rate, regular rhythm, normal heart sounds and intact distal pulses. Pulmonary/Chest: Effort normal and breath sounds normal. Right breast exhibits no inverted nipple, no mass, no nipple discharge, no skin change and no tenderness. Left breast exhibits no inverted nipple, no mass, no nipple discharge, no skin change and no tenderness. No breast swelling, tenderness, discharge or bleeding. Breasts are symmetrical.   Abdominal: Soft. Bowel sounds are normal.   Genitourinary: Rectum normal and vagina normal. Rectal exam shows anal tone normal and guaiac negative stool. No breast swelling, tenderness, discharge or bleeding. Musculoskeletal: Normal range of motion. Neurological: She is alert and oriented to person, place, and time. Skin: Skin is warm and dry. Psychiatric: She has a normal mood and affect. Her behavior is normal. Judgment and thought content normal.   Nursing note and vitals reviewed. ASSESSMENT and PLAN  Diagnoses and all orders for this visit:    1. Lightheaded  Advised pt to monitor her sx. Advised pt to continue decongestant. Will continue to monitor for improvements or changes. Labs normal from [facility]- normal electrolyt and normal SG of urin    2. Tinnitus of right ear  Advised pt to see an ENT. Verbal referral given. 3. Chest tightness or pressure  Ordered chest XR to r/o underlying issues. Waiting on scans. -     XR CHEST PA LAT; Future    Lab results and schedule of future lab studies reviewed with patient. Reviewed diet, exercise and weight control. Written by Nirmal Sanchez, as dictated by Faraz Castro MD.     Current diagnosis and concerns discussed with pt at length. Understands risks and benefits or current treatment plan and medications and accepts the treatment and medication with any possible risks. Pt asks appropriate questions which were answered.  Pt instructed to call with any concerns or problems. This note will not be viewable in [address]

## 2019-06-24 NOTE — TELEPHONE ENCOUNTER
Pt calling to discuss symptoms that started Sat when she returned home from trip. Friday at airport pt found herself very lightheaded and nauseated, was unable to drive home so drove home on Sat. Was seen at [facility] and was diagnosed with fluid in ear. Pt continues with being lightheaded and nauseated, a few occurrences of chest tightness when she lays down at night, abdominal pain, concerned with urine being clear, frequent urination. Thought she may be dehydrated from trip but symptoms continue. Appt provided.

## 2019-06-26 ENCOUNTER — TELEPHONE (OUTPATIENT)
Dept: INTERNAL MEDICINE CLINIC | Age: 59
End: 2019-06-26

## 2019-06-26 NOTE — TELEPHONE ENCOUNTER
Per Patient , she is unable to scheduled her ENT apt with  Alda Arte[facility] until they receive a doctor's order. fx [phone], requesting this be done tmrw.  Patient was advised to call back with apt details for insurance ref

## 2019-08-09 DIAGNOSIS — R51.9 ACUTE INTRACTABLE HEADACHE, UNSPECIFIED HEADACHE TYPE: ICD-10-CM

## 2020-05-27 ENCOUNTER — TELEPHONE (OUTPATIENT)
Dept: INTERNAL MEDICINE CLINIC | Age: 60
End: 2020-05-27

## 2020-05-27 NOTE — TELEPHONE ENCOUNTER
Patient would like to speak with the nurse to discuss her cholesterol history with us , she can be reached at [phone]

## 2023-12-27 ENCOUNTER — TELEPHONE (OUTPATIENT)
Age: 63
End: 2023-12-27

## 2023-12-27 NOTE — TELEPHONE ENCOUNTER
----- Message from Emily Villegas sent at 12/27/2023 12:13 PM EST -----  Subject: Appointment Request    Reason for Call: Established Patient Appointment needed: New Patient   Request Appointment    QUESTIONS    Reason for appointment request? No appointments available during search     Additional Information for Provider? Patient once had PCP Waleska Welch but has not been established in 3 years or more but wants to   reestablished as a new patient she wants to know if PCP will accept her as   a patient and would like to make an appt. Her insurance takes effect in   February so she said sometime after than. Patient had her before but when   she had to get new insurance they did not take that that is only reason   she left from the beginning but now she is getting different insurance   when she turns 64   ---------------------------------------------------------------------------  --------------  CALL BACK INFO  [phone]; OK to leave message on voicemail  ---------------------------------------------------------------------------  --------------  SCRIPT ANSWERS

## 2024-01-02 NOTE — TELEPHONE ENCOUNTER
PSR attempted to reach out to patient to schedule - no answer, left detailed VM of providers note and asked for call back to schedule

## 2024-01-03 ENCOUNTER — TELEPHONE (OUTPATIENT)
Age: 64
End: 2024-01-03

## 2024-01-03 NOTE — TELEPHONE ENCOUNTER
----- Message from Kiersten Clark sent at 1/3/2024 12:44 PM EST -----  Subject: Message to Provider    QUESTIONS  Information for Provider? pt was returning phone call . please call after   330 pm  ---------------------------------------------------------------------------  --------------  CALL BACK INFO  [phone]; OK to leave message on voicemail  ---------------------------------------------------------------------------  --------------  SCRIPT ANSWERS  undefined

## 2024-01-03 NOTE — TELEPHONE ENCOUNTER
PSR reached back out to patient to schedule - patient had to take another call for her insurance says she will call back

## 2024-02-06 NOTE — PROGRESS NOTES
Linda Romero (:  1960) is a 63 y.o. female,Established patient, here for evaluation of the following chief complaint(s):  New Patient         ASSESSMENT/PLAN:  1. Recurrent sinus infections  -     CBC; Future  2. Chronic cough  3. Thinning hair  -     Comprehensive Metabolic Panel; Future  -     TSH; Future  4. Hyperlipidemia LDL goal <100  -     Lipid Panel; Future  5. Dry mouth  -     ARTHUR, Direct, w/Reflex; Future  -     Rheumatiod Arthritis Diagnostic Panel; Future      No follow-ups on file.     Constellation of symptoms that have been going on for a while since since her diagnosis of COVID.  She wondered if she had some long COVID syndrome and certainly her cough and need for inhalers have changed since she had COVID.  I think it is reasonable for her to see pulmonary and get a better evaluation of her underlying lung function.  In terms of her recurrent sinus infections and issues with her glands I think seeing an allergist would be appropriate as she does seem to have some sensitivity to mold and may be the reason why she cannot get better.  Her dry eyes and dry mouth could just be a normal aging process but we will rule out Sjogren's or other autoimmune conditions.  Told her to follow-up with me in a couple months after she seen the specialists.    She had her mammogram with [facility]  Had a colonoscopy with Dr. Peterson will get those records    Subjective   SUBJECTIVE/OBJECTIVE:  HPI  She is back as a new patient; her previous insurance was not covered    She had a physical in August in last year and they did some labs -     Recurrent respiratory illness and sinus    Got covid 2020 and she did have antibodies, then again 2022 ; has not been the same since, she feels she tingles inside and nerve thing but she doesn't shake     She has glands that she feels she is swollen and had resp illness and needed two rounds of steroids and abx and used inhaler  ? Asthma ( when

## 2024-02-08 ENCOUNTER — OFFICE VISIT (OUTPATIENT)
Age: 64
End: 2024-02-08
Payer: MEDICARE

## 2024-02-08 VITALS
OXYGEN SATURATION: 95 % | BODY MASS INDEX: 24.8 KG/M2 | SYSTOLIC BLOOD PRESSURE: 134 MMHG | TEMPERATURE: 97.8 F | HEIGHT: 67 IN | WEIGHT: 158 LBS | HEART RATE: 83 BPM | DIASTOLIC BLOOD PRESSURE: 70 MMHG | RESPIRATION RATE: 16 BRPM

## 2024-02-08 DIAGNOSIS — L65.9 THINNING HAIR: ICD-10-CM

## 2024-02-08 DIAGNOSIS — J32.9 RECURRENT SINUS INFECTIONS: Primary | ICD-10-CM

## 2024-02-08 DIAGNOSIS — J32.9 RECURRENT SINUS INFECTIONS: ICD-10-CM

## 2024-02-08 DIAGNOSIS — E78.5 HYPERLIPIDEMIA LDL GOAL <100: ICD-10-CM

## 2024-02-08 DIAGNOSIS — R68.2 DRY MOUTH: ICD-10-CM

## 2024-02-08 DIAGNOSIS — R05.3 CHRONIC COUGH: ICD-10-CM

## 2024-02-08 PROCEDURE — G8420 CALC BMI NORM PARAMETERS: HCPCS | Performed by: INTERNAL MEDICINE

## 2024-02-08 PROCEDURE — 3017F COLORECTAL CA SCREEN DOC REV: CPT | Performed by: INTERNAL MEDICINE

## 2024-02-08 PROCEDURE — G8484 FLU IMMUNIZE NO ADMIN: HCPCS | Performed by: INTERNAL MEDICINE

## 2024-02-08 PROCEDURE — 99203 OFFICE O/P NEW LOW 30 MIN: CPT | Performed by: INTERNAL MEDICINE

## 2024-02-08 PROCEDURE — 1036F TOBACCO NON-USER: CPT | Performed by: INTERNAL MEDICINE

## 2024-02-08 PROCEDURE — G8427 DOCREV CUR MEDS BY ELIG CLIN: HCPCS | Performed by: INTERNAL MEDICINE

## 2024-02-08 RX ORDER — MULTIVIT-MIN/FERROUS GLUCONATE 9 MG/15 ML
15 LIQUID (ML) ORAL DAILY
COMMUNITY

## 2024-02-08 SDOH — ECONOMIC STABILITY: FOOD INSECURITY: WITHIN THE PAST 12 MONTHS, THE FOOD YOU BOUGHT JUST DIDN'T LAST AND YOU DIDN'T HAVE MONEY TO GET MORE.: NEVER TRUE

## 2024-02-08 SDOH — ECONOMIC STABILITY: HOUSING INSECURITY
IN THE LAST 12 MONTHS, WAS THERE A TIME WHEN YOU DID NOT HAVE A STEADY PLACE TO SLEEP OR SLEPT IN A SHELTER (INCLUDING NOW)?: NO

## 2024-02-08 SDOH — ECONOMIC STABILITY: FOOD INSECURITY: WITHIN THE PAST 12 MONTHS, YOU WORRIED THAT YOUR FOOD WOULD RUN OUT BEFORE YOU GOT MONEY TO BUY MORE.: NEVER TRUE

## 2024-02-08 SDOH — ECONOMIC STABILITY: INCOME INSECURITY: HOW HARD IS IT FOR YOU TO PAY FOR THE VERY BASICS LIKE FOOD, HOUSING, MEDICAL CARE, AND HEATING?: NOT HARD AT ALL

## 2024-02-08 ASSESSMENT — PATIENT HEALTH QUESTIONNAIRE - PHQ9
SUM OF ALL RESPONSES TO PHQ QUESTIONS 1-9: 0
SUM OF ALL RESPONSES TO PHQ9 QUESTIONS 1 & 2: 0
SUM OF ALL RESPONSES TO PHQ QUESTIONS 1-9: 0
2. FEELING DOWN, DEPRESSED OR HOPELESS: 0
1. LITTLE INTEREST OR PLEASURE IN DOING THINGS: 0

## 2024-02-09 LAB
ALBUMIN SERPL-MCNC: 4 G/DL (ref 3.5–5)
ALBUMIN/GLOB SERPL: 1.3 (ref 1.1–2.2)
ALP SERPL-CCNC: 84 U/L (ref 45–117)
ALT SERPL-CCNC: 23 U/L (ref 12–78)
ANION GAP SERPL CALC-SCNC: 4 MMOL/L (ref 5–15)
AST SERPL-CCNC: 15 U/L (ref 15–37)
BILIRUB SERPL-MCNC: 0.3 MG/DL (ref 0.2–1)
BUN SERPL-MCNC: 16 MG/DL (ref 6–20)
BUN/CREAT SERPL: 21 (ref 12–20)
CALCIUM SERPL-MCNC: 9.4 MG/DL (ref 8.5–10.1)
CHLORIDE SERPL-SCNC: 106 MMOL/L (ref 97–108)
CHOLEST SERPL-MCNC: 200 MG/DL
CO2 SERPL-SCNC: 30 MMOL/L (ref 21–32)
CREAT SERPL-MCNC: 0.77 MG/DL (ref 0.55–1.02)
ERYTHROCYTE [DISTWIDTH] IN BLOOD BY AUTOMATED COUNT: 13.1 % (ref 11.5–14.5)
GLOBULIN SER CALC-MCNC: 3.1 G/DL (ref 2–4)
GLUCOSE SERPL-MCNC: 88 MG/DL (ref 65–100)
HCT VFR BLD AUTO: 39.6 % (ref 35–47)
HDLC SERPL-MCNC: 87 MG/DL
HDLC SERPL: 2.3 (ref 0–5)
HGB BLD-MCNC: 12.8 G/DL (ref 11.5–16)
LDLC SERPL CALC-MCNC: 98.8 MG/DL (ref 0–100)
MCH RBC QN AUTO: 30.3 PG (ref 26–34)
MCHC RBC AUTO-ENTMCNC: 32.3 G/DL (ref 30–36.5)
MCV RBC AUTO: 93.6 FL (ref 80–99)
NRBC # BLD: 0 K/UL (ref 0–0.01)
NRBC BLD-RTO: 0 PER 100 WBC
PLATELET # BLD AUTO: 312 K/UL (ref 150–400)
PMV BLD AUTO: 10.3 FL (ref 8.9–12.9)
POTASSIUM SERPL-SCNC: 4 MMOL/L (ref 3.5–5.1)
PROT SERPL-MCNC: 7.1 G/DL (ref 6.4–8.2)
RBC # BLD AUTO: 4.23 M/UL (ref 3.8–5.2)
SODIUM SERPL-SCNC: 140 MMOL/L (ref 136–145)
TRIGL SERPL-MCNC: 71 MG/DL
TSH SERPL DL<=0.05 MIU/L-ACNC: 4.95 UIU/ML (ref 0.36–3.74)
VLDLC SERPL CALC-MCNC: 14.2 MG/DL
WBC # BLD AUTO: 5.4 K/UL (ref 3.6–11)

## 2024-02-11 LAB
CCP IGA+IGG SERPL IA-ACNC: 3 UNITS (ref 0–19)
RHEUMATOID FACT SERPL-ACNC: 11 IU/ML

## 2024-02-13 ENCOUNTER — TELEPHONE (OUTPATIENT)
Age: 64
End: 2024-02-13

## 2024-02-13 NOTE — TELEPHONE ENCOUNTER
----- Message from Christine Soto sent at 2/12/2024  3:57 PM EST -----  Subject: Message to Provider    QUESTIONS  Information for Provider? In addition the fax number to Dr. Adrian is   [phone]  ---------------------------------------------------------------------------  --------------  CALL BACK INFO  [phone]; OK to leave message on voicemail  ---------------------------------------------------------------------------  --------------  SCRIPT ANSWERS  Relationship to Patient? Self

## 2024-02-13 NOTE — TELEPHONE ENCOUNTER
----- Message from Christine Soto sent at 2/12/2024  3:56 PM EST -----  Subject: Message to Provider    QUESTIONS  Information for Provider? patient wanted to know inform the doctor that   the pulmonologist would like the notes from visit faxed over to them. They   wanted to know if she had a CT scan. Also she does have the names for the   OBGYN Dr. Hernandez ([facility])  ---------------------------------------------------------------------------  --------------  CALL BACK INFO  [phone]; OK to leave message on voicemail  ---------------------------------------------------------------------------  --------------  SCRIPT ANSWERS  Relationship to Patient? Self

## 2024-02-15 ENCOUNTER — TELEPHONE (OUTPATIENT)
Age: 64
End: 2024-02-15

## 2024-02-15 RX ORDER — LEVOTHYROXINE SODIUM 0.03 MG/1
25 TABLET ORAL DAILY
Qty: 90 TABLET | Refills: 1 | Status: SHIPPED | OUTPATIENT
Start: 2024-02-15

## 2024-02-15 NOTE — TELEPHONE ENCOUNTER
Left message relaying results and requested patient return call to the office to notify the nurse that she has received the message and if it is ok to send in the new rx.

## 2024-02-15 NOTE — TELEPHONE ENCOUNTER
Linda would like to discuss her results before calling in the new med. Please call her on the number on file. Thank you!

## 2024-02-15 NOTE — TELEPHONE ENCOUNTER
Spoke with patient and discussed lab results and Dr. Jaramillo's recommendation to start a very low dose of thyroid medication.  Patient agreed and rx sent to pharmacy.

## 2024-02-15 NOTE — TELEPHONE ENCOUNTER
----- Message from Waleska Welch MD sent at 2/13/2024  6:21 PM EST -----  Please let her know - no signs of autoimmune arthritis , thyroid is little borderline but not really off to cause her symptoms. We can try a very low dose of thyroid medicine and see if that helps and follow up in a month to go over symptoms- levothyroxine 25

## 2024-03-07 ENCOUNTER — TELEPHONE (OUTPATIENT)
Age: 64
End: 2024-03-07

## 2024-03-07 NOTE — TELEPHONE ENCOUNTER
The patient is requesting a call back to discuss a referral for a rheumatologist. PSR offered an appointment she decline an stated that she will  like to be advised. [phone]

## 2024-03-07 NOTE — TELEPHONE ENCOUNTER
Spoke with patient and provided contact information for [facility].  Pt understood and was thankful for the call.

## 2024-08-19 RX ORDER — LEVOTHYROXINE SODIUM 0.03 MG/1
25 TABLET ORAL DAILY
Qty: 90 TABLET | Refills: 0 | Status: SHIPPED | OUTPATIENT
Start: 2024-08-19

## 2024-11-04 NOTE — PROGRESS NOTES
Linda Romero (:  1960) is a 64 y.o. female,Established patient, here for evaluation of the following chief complaint(s):  Medicare AWV (Thyroid medication follow up. Patient is fasting)          Diagnosis Orders   1. Acquired hypothyroidism  CBC    Lipid Panel    Comprehensive Metabolic Panel    TSH    T4, Free      2. Medicare annual wellness visit, initial  AMB POC EKG ROUTINE W/ 12 LEADS, SCREEN ()      3. Post-menopausal  DEXA BONE DENSITY AXIAL SKELETON         Levothyroxine   Assessment & Plan  1. Asthma.  She reports a history of asthma with some lung damage likely due to a severe respiratory case of Covid-19. She has not felt the need for an inhaler recently.     2. Thyroid Disorder.  She reports some improvement in symptoms since starting thyroid medication. Thyroid levels will be checked today. She is advised to continue the thyroid medication as it has improved her quality of life.    3. Tingling and Numbness.  She experiences tingling and numbness in her hands, which could be related to nerve and sensory issues. Mild carpal tunnel syndrome is a possible contributing factor. No immediate intervention is required as there are no signs of strength loss, falling, or balance issues.    4. Weight Gain.  She reports some weight gain and a noticeable change in her belly size. She is advised to incorporate more weight training into her exercise routine to improve muscle tone.    5. Skin Changes.  She reports changes in her skin, including cysts and bumps. She has seen a dermatologist who did not find any acute issues.    6. Frequent Urination.  She reports frequent urination. Blood work will be conducted today to rule out any underlying conditions. A referral to a urologist is recommended to evaluate bladder function.    7. Health Maintenance.  A bone density test will be ordered to assess bone strength. She is due for a colonoscopy next year. She will receive a one-time pneumonia vaccine

## 2024-11-06 ENCOUNTER — OFFICE VISIT (OUTPATIENT)
Age: 64
End: 2024-11-06
Payer: MEDICARE

## 2024-11-06 VITALS
OXYGEN SATURATION: 98 % | WEIGHT: 162.8 LBS | TEMPERATURE: 97.9 F | SYSTOLIC BLOOD PRESSURE: 125 MMHG | HEIGHT: 67 IN | RESPIRATION RATE: 16 BRPM | HEART RATE: 81 BPM | BODY MASS INDEX: 25.55 KG/M2 | DIASTOLIC BLOOD PRESSURE: 78 MMHG

## 2024-11-06 DIAGNOSIS — Z78.0 POST-MENOPAUSAL: ICD-10-CM

## 2024-11-06 DIAGNOSIS — E03.9 ACQUIRED HYPOTHYROIDISM: Primary | ICD-10-CM

## 2024-11-06 DIAGNOSIS — E03.9 ACQUIRED HYPOTHYROIDISM: ICD-10-CM

## 2024-11-06 DIAGNOSIS — Z00.00 MEDICARE ANNUAL WELLNESS VISIT, INITIAL: ICD-10-CM

## 2024-11-06 LAB
ALBUMIN SERPL-MCNC: 3.8 G/DL (ref 3.5–5)
ALBUMIN/GLOB SERPL: 1.2 (ref 1.1–2.2)
ALP SERPL-CCNC: 82 U/L (ref 45–117)
ALT SERPL-CCNC: 24 U/L (ref 12–78)
ANION GAP SERPL CALC-SCNC: 3 MMOL/L (ref 2–12)
AST SERPL-CCNC: 15 U/L (ref 15–37)
BILIRUB SERPL-MCNC: 0.3 MG/DL (ref 0.2–1)
BUN SERPL-MCNC: 15 MG/DL (ref 6–20)
BUN/CREAT SERPL: 18 (ref 12–20)
CALCIUM SERPL-MCNC: 9.2 MG/DL (ref 8.5–10.1)
CHLORIDE SERPL-SCNC: 107 MMOL/L (ref 97–108)
CHOLEST SERPL-MCNC: 225 MG/DL
CO2 SERPL-SCNC: 29 MMOL/L (ref 21–32)
CREAT SERPL-MCNC: 0.84 MG/DL (ref 0.55–1.02)
ERYTHROCYTE [DISTWIDTH] IN BLOOD BY AUTOMATED COUNT: 13.1 % (ref 11.5–14.5)
GLOBULIN SER CALC-MCNC: 3.3 G/DL (ref 2–4)
GLUCOSE SERPL-MCNC: 102 MG/DL (ref 65–100)
HCT VFR BLD AUTO: 39 % (ref 35–47)
HDLC SERPL-MCNC: 97 MG/DL
HDLC SERPL: 2.3 (ref 0–5)
HGB BLD-MCNC: 12.9 G/DL (ref 11.5–16)
LDLC SERPL CALC-MCNC: 115.4 MG/DL (ref 0–100)
MCH RBC QN AUTO: 29.9 PG (ref 26–34)
MCHC RBC AUTO-ENTMCNC: 33.1 G/DL (ref 30–36.5)
MCV RBC AUTO: 90.3 FL (ref 80–99)
NRBC # BLD: 0 K/UL (ref 0–0.01)
NRBC BLD-RTO: 0 PER 100 WBC
PLATELET # BLD AUTO: 296 K/UL (ref 150–400)
PMV BLD AUTO: 10.1 FL (ref 8.9–12.9)
POTASSIUM SERPL-SCNC: 4.6 MMOL/L (ref 3.5–5.1)
PROT SERPL-MCNC: 7.1 G/DL (ref 6.4–8.2)
RBC # BLD AUTO: 4.32 M/UL (ref 3.8–5.2)
SODIUM SERPL-SCNC: 139 MMOL/L (ref 136–145)
T4 FREE SERPL-MCNC: 1 NG/DL (ref 0.8–1.5)
TRIGL SERPL-MCNC: 63 MG/DL
TSH SERPL DL<=0.05 MIU/L-ACNC: 4.35 UIU/ML (ref 0.36–3.74)
VLDLC SERPL CALC-MCNC: 12.6 MG/DL
WBC # BLD AUTO: 5.8 K/UL (ref 3.6–11)

## 2024-11-06 PROCEDURE — 99213 OFFICE O/P EST LOW 20 MIN: CPT | Performed by: INTERNAL MEDICINE

## 2024-11-06 PROCEDURE — 90677 PCV20 VACCINE IM: CPT | Performed by: INTERNAL MEDICINE

## 2024-11-06 ASSESSMENT — LIFESTYLE VARIABLES
HOW OFTEN DO YOU HAVE A DRINK CONTAINING ALCOHOL: NEVER
HOW MANY STANDARD DRINKS CONTAINING ALCOHOL DO YOU HAVE ON A TYPICAL DAY: PATIENT DOES NOT DRINK

## 2024-11-06 ASSESSMENT — PATIENT HEALTH QUESTIONNAIRE - PHQ9
2. FEELING DOWN, DEPRESSED OR HOPELESS: NOT AT ALL
SUM OF ALL RESPONSES TO PHQ QUESTIONS 1-9: 0
1. LITTLE INTEREST OR PLEASURE IN DOING THINGS: NOT AT ALL
SUM OF ALL RESPONSES TO PHQ QUESTIONS 1-9: 0
SUM OF ALL RESPONSES TO PHQ9 QUESTIONS 1 & 2: 0

## 2024-11-06 NOTE — PROGRESS NOTES
\"Have you been to the ER, urgent care clinic since your last visit?  Hospitalized since your last visit?\"    NO    “Have you seen or consulted any other health care providers outside our system since your last visit?”    NO     “Have you had a pap smear?”    YES - Where: [facility]; Dr. Lizbeth Hernandez Nurse/CMA to request most recent records if not in the chart    Date of last Cervical Cancer screen (HPV or PAP): 11/21/2017       “Have you had a colorectal cancer screening such as a colonoscopy/FIT/Cologuard?    NO- Due next year; Dr. Persaud    Date of last Colonoscopy: 2/3/2015  No cologuard on file  No FIT/FOBT on file   No flexible sigmoidoscopy on file

## 2024-11-06 NOTE — PROGRESS NOTES
Patient present for routine immunizations.   Pt denies any symptoms , reactions or allergies that would exclude them from being immunized today.  Risks and adverse reactions were discussed and the VIS was given to them. All questions were addressed.  There were no reactions observed.    SUSAN DICKENS RN

## 2024-11-08 ENCOUNTER — TELEPHONE (OUTPATIENT)
Age: 64
End: 2024-11-08

## 2024-11-08 NOTE — TELEPHONE ENCOUNTER
----- Message from Dr. Waleska Welch MD sent at 11/8/2024 12:04 PM EST -----  Please let her know we can increase the thyroid medicine a little more- can we increase it to 50 mcg? It may helps some! Rest of labs are good ,

## 2024-11-08 NOTE — TELEPHONE ENCOUNTER
I called pt on both numbers. No answer. LM on VM to call back for test results. When pt calls back please notify of the below message from pcp

## 2024-11-09 DIAGNOSIS — E03.9 ACQUIRED HYPOTHYROIDISM: Primary | ICD-10-CM

## 2024-11-09 RX ORDER — LEVOTHYROXINE SODIUM 50 UG/1
50 TABLET ORAL DAILY
Qty: 30 TABLET | Refills: 3 | Status: SHIPPED | OUTPATIENT
Start: 2024-11-09

## 2024-11-14 ENCOUNTER — TELEPHONE (OUTPATIENT)
Age: 64
End: 2024-11-14

## 2025-01-20 RX ORDER — LEVOTHYROXINE SODIUM 25 UG/1
25 TABLET ORAL DAILY
Qty: 90 TABLET | Refills: 0 | Status: SHIPPED | OUTPATIENT
Start: 2025-01-20

## 2025-03-24 DIAGNOSIS — E03.9 ACQUIRED HYPOTHYROIDISM: ICD-10-CM

## 2025-03-25 RX ORDER — LEVOTHYROXINE SODIUM 50 UG/1
50 TABLET ORAL DAILY
Qty: 30 TABLET | Refills: 3 | Status: SHIPPED | OUTPATIENT
Start: 2025-03-25

## 2025-04-24 ENCOUNTER — OFFICE VISIT (OUTPATIENT)
Facility: CLINIC | Age: 65
End: 2025-04-24
Payer: MEDICARE

## 2025-04-24 VITALS
HEIGHT: 67 IN | OXYGEN SATURATION: 96 % | BODY MASS INDEX: 25.77 KG/M2 | SYSTOLIC BLOOD PRESSURE: 150 MMHG | DIASTOLIC BLOOD PRESSURE: 74 MMHG | TEMPERATURE: 98.1 F | HEART RATE: 83 BPM | WEIGHT: 164.2 LBS | RESPIRATION RATE: 16 BRPM

## 2025-04-24 DIAGNOSIS — E03.9 ACQUIRED HYPOTHYROIDISM: ICD-10-CM

## 2025-04-24 DIAGNOSIS — R73.09 ABNORMAL GLUCOSE: ICD-10-CM

## 2025-04-24 DIAGNOSIS — E03.9 ACQUIRED HYPOTHYROIDISM: Primary | ICD-10-CM

## 2025-04-24 PROCEDURE — 3017F COLORECTAL CA SCREEN DOC REV: CPT | Performed by: INTERNAL MEDICINE

## 2025-04-24 PROCEDURE — 99214 OFFICE O/P EST MOD 30 MIN: CPT | Performed by: INTERNAL MEDICINE

## 2025-04-24 PROCEDURE — 1036F TOBACCO NON-USER: CPT | Performed by: INTERNAL MEDICINE

## 2025-04-24 PROCEDURE — 1123F ACP DISCUSS/DSCN MKR DOCD: CPT | Performed by: INTERNAL MEDICINE

## 2025-04-24 PROCEDURE — G8400 PT W/DXA NO RESULTS DOC: HCPCS | Performed by: INTERNAL MEDICINE

## 2025-04-24 PROCEDURE — G8419 CALC BMI OUT NRM PARAM NOF/U: HCPCS | Performed by: INTERNAL MEDICINE

## 2025-04-24 PROCEDURE — 1090F PRES/ABSN URINE INCON ASSESS: CPT | Performed by: INTERNAL MEDICINE

## 2025-04-24 PROCEDURE — G8428 CUR MEDS NOT DOCUMENT: HCPCS | Performed by: INTERNAL MEDICINE

## 2025-04-24 SDOH — ECONOMIC STABILITY: FOOD INSECURITY: WITHIN THE PAST 12 MONTHS, YOU WORRIED THAT YOUR FOOD WOULD RUN OUT BEFORE YOU GOT MONEY TO BUY MORE.: NEVER TRUE

## 2025-04-24 SDOH — ECONOMIC STABILITY: FOOD INSECURITY: WITHIN THE PAST 12 MONTHS, THE FOOD YOU BOUGHT JUST DIDN'T LAST AND YOU DIDN'T HAVE MONEY TO GET MORE.: NEVER TRUE

## 2025-04-24 ASSESSMENT — PATIENT HEALTH QUESTIONNAIRE - PHQ9
SUM OF ALL RESPONSES TO PHQ QUESTIONS 1-9: 0
1. LITTLE INTEREST OR PLEASURE IN DOING THINGS: NOT AT ALL
2. FEELING DOWN, DEPRESSED OR HOPELESS: NOT AT ALL
SUM OF ALL RESPONSES TO PHQ QUESTIONS 1-9: 0

## 2025-04-25 LAB
EST. AVERAGE GLUCOSE BLD GHB EST-MCNC: 123 MG/DL
HBA1C MFR BLD: 5.9 % (ref 4–5.6)
T4 FREE SERPL-MCNC: 1.2 NG/DL (ref 0.8–1.5)
TSH SERPL DL<=0.05 MIU/L-ACNC: 2.3 UIU/ML (ref 0.36–3.74)

## 2025-05-12 ENCOUNTER — PATIENT MESSAGE (OUTPATIENT)
Facility: CLINIC | Age: 65
End: 2025-05-12

## 2025-07-21 DIAGNOSIS — E03.9 ACQUIRED HYPOTHYROIDISM: ICD-10-CM

## 2025-07-21 RX ORDER — LEVOTHYROXINE SODIUM 50 UG/1
50 TABLET ORAL DAILY
Qty: 90 TABLET | Refills: 3 | Status: SHIPPED | OUTPATIENT
Start: 2025-07-21